# Patient Record
Sex: FEMALE | Race: WHITE | NOT HISPANIC OR LATINO | Employment: OTHER | URBAN - METROPOLITAN AREA
[De-identification: names, ages, dates, MRNs, and addresses within clinical notes are randomized per-mention and may not be internally consistent; named-entity substitution may affect disease eponyms.]

---

## 2019-03-29 ENCOUNTER — HOSPITAL ENCOUNTER (EMERGENCY)
Facility: HOSPITAL | Age: 79
Discharge: HOME | End: 2019-03-29
Attending: EMERGENCY MEDICINE | Admitting: EMERGENCY MEDICINE

## 2019-03-29 ENCOUNTER — APPOINTMENT (OUTPATIENT)
Dept: GENERAL RADIOLOGY | Facility: HOSPITAL | Age: 79
End: 2019-03-29

## 2019-03-29 VITALS
OXYGEN SATURATION: 97 % | HEIGHT: 64 IN | TEMPERATURE: 97.6 F | WEIGHT: 202 LBS | HEART RATE: 67 BPM | SYSTOLIC BLOOD PRESSURE: 168 MMHG | DIASTOLIC BLOOD PRESSURE: 78 MMHG | RESPIRATION RATE: 16 BRPM | BODY MASS INDEX: 34.49 KG/M2

## 2019-03-29 DIAGNOSIS — I10 HYPERTENSION, UNSPECIFIED TYPE: ICD-10-CM

## 2019-03-29 DIAGNOSIS — R07.89 ATYPICAL CHEST PAIN: Primary | ICD-10-CM

## 2019-03-29 LAB
ALBUMIN SERPL-MCNC: 4 G/DL (ref 3.5–5.2)
ALBUMIN/GLOB SERPL: 1.4 G/DL
ALP SERPL-CCNC: 75 U/L (ref 39–117)
ALT SERPL W P-5'-P-CCNC: 20 U/L (ref 1–33)
ANION GAP SERPL CALCULATED.3IONS-SCNC: 12.2 MMOL/L
AST SERPL-CCNC: 19 U/L (ref 1–32)
BASOPHILS # BLD AUTO: 0.05 10*3/MM3 (ref 0–0.2)
BASOPHILS NFR BLD AUTO: 0.7 % (ref 0–1.5)
BILIRUB SERPL-MCNC: 0.6 MG/DL (ref 0.2–1.2)
BUN BLD-MCNC: 7 MG/DL (ref 8–23)
BUN/CREAT SERPL: 11.1 (ref 7–25)
CALCIUM SPEC-SCNC: 9.3 MG/DL (ref 8.6–10.5)
CHLORIDE SERPL-SCNC: 102 MMOL/L (ref 98–107)
CO2 SERPL-SCNC: 26.8 MMOL/L (ref 22–29)
CREAT BLD-MCNC: 0.63 MG/DL (ref 0.57–1)
DEPRECATED RDW RBC AUTO: 43.4 FL (ref 37–54)
EOSINOPHIL # BLD AUTO: 0.08 10*3/MM3 (ref 0–0.4)
EOSINOPHIL NFR BLD AUTO: 1.2 % (ref 0.3–6.2)
ERYTHROCYTE [DISTWIDTH] IN BLOOD BY AUTOMATED COUNT: 12.9 % (ref 12.3–15.4)
GFR SERPL CREATININE-BSD FRML MDRD: 92 ML/MIN/1.73
GLOBULIN UR ELPH-MCNC: 2.9 GM/DL
GLUCOSE BLD-MCNC: 98 MG/DL (ref 65–99)
HCT VFR BLD AUTO: 44.2 % (ref 34–46.6)
HGB BLD-MCNC: 15 G/DL (ref 12–15.9)
HOLD SPECIMEN: NORMAL
HOLD SPECIMEN: NORMAL
IMM GRANULOCYTES # BLD AUTO: 0.01 10*3/MM3 (ref 0–0.05)
IMM GRANULOCYTES NFR BLD AUTO: 0.1 % (ref 0–0.5)
LYMPHOCYTES # BLD AUTO: 2.1 10*3/MM3 (ref 0.7–3.1)
LYMPHOCYTES NFR BLD AUTO: 31.4 % (ref 19.6–45.3)
MCH RBC QN AUTO: 31.1 PG (ref 26.6–33)
MCHC RBC AUTO-ENTMCNC: 33.9 G/DL (ref 31.5–35.7)
MCV RBC AUTO: 91.5 FL (ref 79–97)
MONOCYTES # BLD AUTO: 0.53 10*3/MM3 (ref 0.1–0.9)
MONOCYTES NFR BLD AUTO: 7.9 % (ref 5–12)
NEUTROPHILS # BLD AUTO: 3.91 10*3/MM3 (ref 1.4–7)
NEUTROPHILS NFR BLD AUTO: 58.7 % (ref 42.7–76)
NRBC BLD AUTO-RTO: 0 /100 WBC (ref 0–0)
PLATELET # BLD AUTO: 189 10*3/MM3 (ref 140–450)
PMV BLD AUTO: 10.8 FL (ref 6–12)
POTASSIUM BLD-SCNC: 4 MMOL/L (ref 3.5–5.2)
PROT SERPL-MCNC: 6.9 G/DL (ref 6–8.5)
RBC # BLD AUTO: 4.83 10*6/MM3 (ref 3.77–5.28)
SODIUM BLD-SCNC: 141 MMOL/L (ref 136–145)
TROPONIN T SERPL-MCNC: <0.01 NG/ML (ref 0–0.03)
TROPONIN T SERPL-MCNC: <0.01 NG/ML (ref 0–0.03)
WBC NRBC COR # BLD: 6.68 10*3/MM3 (ref 3.4–10.8)
WHOLE BLOOD HOLD SPECIMEN: NORMAL
WHOLE BLOOD HOLD SPECIMEN: NORMAL

## 2019-03-29 PROCEDURE — 93010 ELECTROCARDIOGRAM REPORT: CPT | Performed by: INTERNAL MEDICINE

## 2019-03-29 PROCEDURE — 71046 X-RAY EXAM CHEST 2 VIEWS: CPT

## 2019-03-29 PROCEDURE — 36415 COLL VENOUS BLD VENIPUNCTURE: CPT

## 2019-03-29 PROCEDURE — 85025 COMPLETE CBC W/AUTO DIFF WBC: CPT | Performed by: PHYSICIAN ASSISTANT

## 2019-03-29 PROCEDURE — 93005 ELECTROCARDIOGRAM TRACING: CPT | Performed by: EMERGENCY MEDICINE

## 2019-03-29 PROCEDURE — 80053 COMPREHEN METABOLIC PANEL: CPT | Performed by: PHYSICIAN ASSISTANT

## 2019-03-29 PROCEDURE — 84484 ASSAY OF TROPONIN QUANT: CPT | Performed by: PHYSICIAN ASSISTANT

## 2019-03-29 PROCEDURE — 93005 ELECTROCARDIOGRAM TRACING: CPT

## 2019-03-29 PROCEDURE — 99284 EMERGENCY DEPT VISIT MOD MDM: CPT

## 2019-03-29 RX ORDER — SODIUM CHLORIDE 0.9 % (FLUSH) 0.9 %
10 SYRINGE (ML) INJECTION AS NEEDED
Status: DISCONTINUED | OUTPATIENT
Start: 2019-03-29 | End: 2019-03-29 | Stop reason: HOSPADM

## 2019-03-29 RX ORDER — AMLODIPINE BESYLATE 5 MG/1
5 TABLET ORAL DAILY
Qty: 30 TABLET | Refills: 0 | Status: SHIPPED | OUTPATIENT
Start: 2019-03-29

## 2019-03-29 RX ORDER — BUDESONIDE 3 MG/1
6 CAPSULE, COATED PELLETS ORAL EVERY MORNING
COMMUNITY

## 2019-03-29 RX ORDER — LIOTHYRONINE SODIUM 25 UG/1
25 TABLET ORAL DAILY
COMMUNITY

## 2019-04-02 ENCOUNTER — OFFICE VISIT (OUTPATIENT)
Dept: CARDIOLOGY | Facility: CLINIC | Age: 79
End: 2019-04-02

## 2019-04-02 VITALS
DIASTOLIC BLOOD PRESSURE: 110 MMHG | BODY MASS INDEX: 34.49 KG/M2 | WEIGHT: 202 LBS | HEART RATE: 90 BPM | SYSTOLIC BLOOD PRESSURE: 180 MMHG | HEIGHT: 64 IN

## 2019-04-02 DIAGNOSIS — R07.2 PRECORDIAL PAIN: Primary | ICD-10-CM

## 2019-04-02 DIAGNOSIS — R07.2 PRECORDIAL PAIN: ICD-10-CM

## 2019-04-02 DIAGNOSIS — I10 ESSENTIAL HYPERTENSION: ICD-10-CM

## 2019-04-02 DIAGNOSIS — R26.2 INABILITY TO AMBULATE DUE TO MULTIPLE JOINTS: ICD-10-CM

## 2019-04-02 PROCEDURE — 99204 OFFICE O/P NEW MOD 45 MIN: CPT | Performed by: INTERNAL MEDICINE

## 2019-04-02 RX ORDER — CARVEDILOL 3.12 MG/1
TABLET ORAL
Qty: 180 TABLET | Refills: 3 | Status: SHIPPED | OUTPATIENT
Start: 2019-04-02

## 2019-04-02 RX ORDER — CARVEDILOL 3.12 MG/1
3.12 TABLET ORAL 2 TIMES DAILY
Qty: 60 TABLET | Refills: 11 | Status: SHIPPED | OUTPATIENT
Start: 2019-04-02 | End: 2019-04-02 | Stop reason: SDUPTHER

## 2019-04-02 NOTE — PROGRESS NOTES
Subjective:     Encounter Date:04/02/2019      Patient ID: Demetria Vitale is a 78 y.o. female.    Chief Complaint: CP  History of Present Illness    Dear Dr. Lucero,    I had the pleasure seeing this patient in the office today for initial evaluation and consultation.  She comes in for evaluation from the emergency room- she was seen March 29 with chest discomfort.    This patient has no known cardiac history.  This patient has no history of coronary artery disease, congestive heart failure, rheumatic fever, rheumatic heart disease, congenital heart disease or heart murmur.  This patient has never required invasive cardiovascular evaluation.    She has been having episodes of chest discomfort.  This is a left inframammary pain with precordial pain as well as back pain.  She also has epigastric discomfort associated with it as well.  It is not associated with activity or exercise.  Other than above there is no other radiation.  She has been getting fatigued lately she has been getting short of breath lately she recently was found to have a markedly elevated thyroid hormone level-she thinks she was taken the wrong dose.  It has been adjusted but she has been having tachycardia and palpitations.  Also her blood pressure has been markedly elevated, much more than it ever had been, since her thyroid function test was found to be abnormal.    She has not had any lower extremity edema.  No orthopnea or PND.  She has had some occasional feelings of feeling more warm since the thyroid levels were found to be high.  No chills, no fevers.    She had an echocardiogram maybe 10 years ago that was normal.    The following portions of the patient's history were reviewed and updated as appropriate: allergies, current medications, past family history, past medical history, past social history, past surgical history and problem list.    Past Medical History:   Diagnosis Date   • Asthma    • Atypical chest pain    • Atypical  migraine    • Cancer (CMS/HCC)     skin   • Disease of thyroid gland    • Diverticulitis    • Hypertension    • Lupus    • Microscopic colitis    • Sleep apnea     cpap       Past Surgical History:   Procedure Laterality Date   • FRACTURE SURGERY     • SKIN BIOPSY      2 lesions removed from arms   • TONSILLECTOMY         Social History     Socioeconomic History   • Marital status:      Spouse name: Not on file   • Number of children: Not on file   • Years of education: Not on file   • Highest education level: Not on file   Tobacco Use   • Smoking status: Passive Smoke Exposure - Never Smoker   • Tobacco comment: parents were smokers, ex  was a smoker   Substance and Sexual Activity   • Alcohol use: Yes     Comment: 2-3 drinks per YEAR; rare/ caff use   • Drug use: No   • Sexual activity: Defer       Review of Systems   Constitution: Negative for chills, decreased appetite, fever and night sweats.   HENT: Negative for ear discharge, ear pain, hearing loss, nosebleeds and sore throat.    Eyes: Negative for blurred vision, double vision and pain.   Cardiovascular: Negative for cyanosis.   Respiratory: Negative for hemoptysis and sputum production.    Endocrine: Negative for cold intolerance and heat intolerance.   Hematologic/Lymphatic: Negative for adenopathy.   Skin: Negative for dry skin, itching, nail changes, rash and suspicious lesions.   Musculoskeletal: Negative for arthritis, gout, muscle cramps, muscle weakness, myalgias and neck pain.   Gastrointestinal: Negative for anorexia, bowel incontinence, constipation, diarrhea, dysphagia, hematemesis and jaundice.   Genitourinary: Negative for bladder incontinence, dysuria, flank pain, frequency, hematuria and nocturia.   Neurological: Negative for focal weakness, numbness, paresthesias and seizures.   Psychiatric/Behavioral: Negative for altered mental status, hallucinations, hypervigilance, suicidal ideas and thoughts of violence.  "  Allergic/Immunologic: Negative for persistent infections.       Procedures       Objective:     Vitals:    04/02/19 0919 04/02/19 0929   BP: (!) 180/110    BP Location: Left arm    Pulse: 66 90   Weight: 91.6 kg (202 lb)    Height: 162.6 cm (64\")          Physical Exam   Constitutional: She is oriented to person, place, and time. She appears well-developed and well-nourished. No distress.   HENT:   Head: Normocephalic and atraumatic.   Nose: Nose normal.   Mouth/Throat: Oropharynx is clear and moist.   Eyes: Conjunctivae and EOM are normal. Pupils are equal, round, and reactive to light. Right eye exhibits no discharge. Left eye exhibits no discharge.   Neck: Normal range of motion. Neck supple. No tracheal deviation present. No thyromegaly present.   Cardiovascular: Normal rate, regular rhythm, S1 normal, S2 normal, normal heart sounds and normal pulses. Exam reveals no S3.   Pulmonary/Chest: Effort normal and breath sounds normal. No stridor. No respiratory distress. She exhibits no tenderness.   Abdominal: Soft. Bowel sounds are normal. She exhibits no distension and no mass. There is no tenderness. There is no rebound and no guarding.   Musculoskeletal: Normal range of motion. She exhibits no tenderness or deformity.   Lymphadenopathy:     She has no cervical adenopathy.   Neurological: She is alert and oriented to person, place, and time. She has normal reflexes.   Skin: Skin is warm and dry. No rash noted. She is not diaphoretic. No erythema.   Psychiatric: She has a normal mood and affect. Thought content normal.       Lab Review:   Results from last 7 days   Lab Units 03/29/19  1208   SODIUM mmol/L 141   POTASSIUM mmol/L 4.0   CHLORIDE mmol/L 102   CO2 mmol/L 26.8   BUN mg/dL 7*   CREATININE mg/dL 0.63   GLUCOSE mg/dL 98   CALCIUM mg/dL 9.3           Performed        Assessment:          Diagnosis Plan   1. Precordial pain  Stress Test With Myocardial Perfusion One Day    carvedilol (COREG) 3.125 MG tablet "   2. Essential hypertension  Stress Test With Myocardial Perfusion One Day    carvedilol (COREG) 3.125 MG tablet   3. Inability to ambulate due to multiple joints            Plan:       1.  Chest discomfort-with both typical and atypical components.  Patient is unable to ambulate on treadmill, we will arrange for a Lexiscan Cardiolite to be obtained  2.  Hypertension-markedly elevated but in the setting of extremely elevated thyroid hormone level.  She has been having palpitations and tachycardia.  I will add carvedilol 3.25 mg to take twice daily.  Patient is scheduled to follow-up with you next week on her blood pressure; I will defer any further titration to yourself.  Once the thyroid hormones returned to normal and she is back to a euthyroid status she may not require the same antihypertensive regimen.    Thank you very much for allowing us to participate in the care of this pleasant patient.  Please don't hesitate to call if I can be of assistance in any way.      Current Outpatient Medications:   •  amLODIPine (NORVASC) 5 MG tablet, Take 1 tablet by mouth Daily., Disp: 30 tablet, Rfl: 0  •  Budesonide (ENTOCORT EC) 3 MG 24 hr capsule, Take 6 mg by mouth Every Morning., Disp: , Rfl:   •  DHEA 50 MG capsule, Take 100 mg by mouth., Disp: , Rfl:   •  liothyronine (CYTOMEL) 25 MCG tablet, Take 25 mcg by mouth Daily., Disp: , Rfl:   •  O2 (OXYGEN), Inhale 1 (One) Time., Disp: , Rfl:   •  vitamin D (ERGOCALCIFEROL) 38578 UNITS capsule capsule, TAKE 1 CAPSULE BY MOUTH EVERY 7 DAYS, Disp: , Rfl:   •  HYDROcodone-acetaminophen (NORCO) 5-325 MG per tablet, Take 1 tablet by mouth every 6 (six) hours as needed for moderate pain (4-6)., Disp: 20 tablet, Rfl: 0  •  hydroxychloroquine (PLAQUENIL) 200 MG tablet, Take 200 mg by mouth., Disp: , Rfl:   •  ondansetron ODT (ZOFRAN ODT) 4 MG disintegrating tablet, Take 1 tablet by mouth every 8 (eight) hours as needed for nausea or vomiting., Disp: 12 tablet, Rfl: 0  •  Thyroid  (ARMOUR THYROID) 60 MG PO tablet, Take 60 mg by mouth., Disp: , Rfl:

## 2019-04-09 ENCOUNTER — HOSPITAL ENCOUNTER (OUTPATIENT)
Dept: CARDIOLOGY | Facility: HOSPITAL | Age: 79
Discharge: HOME | End: 2019-04-09
Admitting: INTERNAL MEDICINE

## 2019-04-09 ENCOUNTER — TELEPHONE (OUTPATIENT)
Dept: CARDIOLOGY | Facility: CLINIC | Age: 79
End: 2019-04-09

## 2019-04-09 VITALS — HEIGHT: 64 IN | WEIGHT: 201.94 LBS | BODY MASS INDEX: 34.48 KG/M2

## 2019-04-09 DIAGNOSIS — R07.2 PRECORDIAL PAIN: ICD-10-CM

## 2019-04-09 DIAGNOSIS — I10 ESSENTIAL HYPERTENSION: ICD-10-CM

## 2019-04-09 LAB
BH CV NUCLEAR PRIOR STUDY: 2
BH CV STRESS BP STAGE 1: NORMAL
BH CV STRESS COMMENTS STAGE 1: NORMAL
BH CV STRESS DOSE REGADENOSON STAGE 1: 0.4
BH CV STRESS DURATION MIN STAGE 1: 0
BH CV STRESS DURATION SEC STAGE 1: 10
BH CV STRESS HR STAGE 1: 107
BH CV STRESS PROTOCOL 1: NORMAL
BH CV STRESS RECOVERY BP: NORMAL MMHG
BH CV STRESS RECOVERY HR: 82 BPM
BH CV STRESS STAGE 1: 1
LV EF NUC BP: 64 %
MAXIMAL PREDICTED HEART RATE: 144 BPM
PERCENT MAX PREDICTED HR: 74.31 %
STRESS BASELINE BP: NORMAL MMHG
STRESS BASELINE HR: 72 BPM
STRESS PERCENT HR: 87 %
STRESS POST EXERCISE DUR SEC: 10 SEC
STRESS POST O2 SAT PEAK: 82 %
STRESS POST PEAK BP: NORMAL MMHG
STRESS POST PEAK HR: 107 BPM
STRESS TARGET HR: 122 BPM

## 2019-04-09 PROCEDURE — 25010000002 REGADENOSON 0.4 MG/5ML SOLUTION: Performed by: INTERNAL MEDICINE

## 2019-04-09 PROCEDURE — 93017 CV STRESS TEST TRACING ONLY: CPT

## 2019-04-09 PROCEDURE — 0 TECHNETIUM TETROFOSMIN KIT: Performed by: INTERNAL MEDICINE

## 2019-04-09 PROCEDURE — 78452 HT MUSCLE IMAGE SPECT MULT: CPT | Performed by: INTERNAL MEDICINE

## 2019-04-09 PROCEDURE — 93018 CV STRESS TEST I&R ONLY: CPT | Performed by: INTERNAL MEDICINE

## 2019-04-09 PROCEDURE — 78452 HT MUSCLE IMAGE SPECT MULT: CPT

## 2019-04-09 PROCEDURE — A9502 TC99M TETROFOSMIN: HCPCS | Performed by: INTERNAL MEDICINE

## 2019-04-09 RX ADMIN — REGADENOSON 0.4 MG: 0.08 INJECTION, SOLUTION INTRAVENOUS at 08:46

## 2019-04-09 RX ADMIN — TETROFOSMIN 1 DOSE: 1.38 INJECTION, POWDER, LYOPHILIZED, FOR SOLUTION INTRAVENOUS at 08:46

## 2019-04-09 RX ADMIN — TETROFOSMIN 1 DOSE: 1.38 INJECTION, POWDER, LYOPHILIZED, FOR SOLUTION INTRAVENOUS at 07:55

## 2019-04-09 NOTE — TELEPHONE ENCOUNTER
Called and left a VMM for pt to call the office back and ask for Triage.  Bushra Hutson  Triage RN

## 2019-04-09 NOTE — TELEPHONE ENCOUNTER
----- Message from Ashvin Lopez III, MD sent at 4/9/2019  3:03 PM EDT -----  Please call- normal results

## 2019-04-17 ENCOUNTER — TELEPHONE (OUTPATIENT)
Dept: CARDIOLOGY | Facility: CLINIC | Age: 79
End: 2019-04-17

## 2019-04-17 NOTE — TELEPHONE ENCOUNTER
Pt called to let you know that her thyroid Is stable. She was wanting to know if you would like for her to stay on Carvedilol?    Please advise. PT #: [phone]    Thanks Rasheeda

## 2020-08-12 ENCOUNTER — TELEPHONE (OUTPATIENT)
Dept: CARDIOLOGY | Facility: CLINIC | Age: 80
End: 2020-08-12

## 2020-08-12 NOTE — TELEPHONE ENCOUNTER
Dr Sainz    Mrs Vitale is a current patient of Dr Lopez but would like to switch to your care.  You took care of her , and she shanks only seen Dr Lopez once.    Thank you  Cristine WHEAT

## 2020-08-19 NOTE — TELEPHONE ENCOUNTER
I am sorry, at this point I cannot accommodate as the schedule is still full and I do not want her to wait.

## 2020-08-26 NOTE — TELEPHONE ENCOUNTER
STP, she states her heart needed help and she switched to [facility].    When I spoke with Ms Vitale, she did not state she was having any symptoms, just wanted to re-establish care.    Thank you  Cristine WHEAT